# Patient Record
Sex: FEMALE | Race: WHITE | NOT HISPANIC OR LATINO
[De-identification: names, ages, dates, MRNs, and addresses within clinical notes are randomized per-mention and may not be internally consistent; named-entity substitution may affect disease eponyms.]

---

## 2021-11-23 PROBLEM — Z00.00 ENCOUNTER FOR PREVENTIVE HEALTH EXAMINATION: Status: ACTIVE | Noted: 2021-11-23

## 2021-11-29 ENCOUNTER — APPOINTMENT (OUTPATIENT)
Dept: OTOLARYNGOLOGY | Facility: CLINIC | Age: 86
End: 2021-11-29
Payer: MEDICARE

## 2021-11-29 VITALS
SYSTOLIC BLOOD PRESSURE: 123 MMHG | BODY MASS INDEX: 20.25 KG/M2 | DIASTOLIC BLOOD PRESSURE: 76 MMHG | WEIGHT: 126 LBS | HEART RATE: 56 BPM | HEIGHT: 66 IN

## 2021-11-29 DIAGNOSIS — Z80.9 FAMILY HISTORY OF MALIGNANT NEOPLASM, UNSPECIFIED: ICD-10-CM

## 2021-11-29 DIAGNOSIS — Z78.9 OTHER SPECIFIED HEALTH STATUS: ICD-10-CM

## 2021-11-29 DIAGNOSIS — H92.01 OTALGIA, RIGHT EAR: ICD-10-CM

## 2021-11-29 DIAGNOSIS — Z85.820 PERSONAL HISTORY OF MALIGNANT MELANOMA OF SKIN: ICD-10-CM

## 2021-11-29 DIAGNOSIS — Z85.3 PERSONAL HISTORY OF MALIGNANT NEOPLASM OF BREAST: ICD-10-CM

## 2021-11-29 DIAGNOSIS — H61.21 IMPACTED CERUMEN, RIGHT EAR: ICD-10-CM

## 2021-11-29 PROCEDURE — 69210 REMOVE IMPACTED EAR WAX UNI: CPT | Mod: RT

## 2021-11-29 PROCEDURE — 99213 OFFICE O/P EST LOW 20 MIN: CPT | Mod: 25

## 2021-11-29 PROCEDURE — 99203 OFFICE O/P NEW LOW 30 MIN: CPT

## 2021-11-29 RX ORDER — METOPROLOL SUCCINATE 25 MG/1
25 TABLET, EXTENDED RELEASE ORAL
Qty: 90 | Refills: 0 | Status: COMPLETED | COMMUNITY
Start: 2021-06-30

## 2021-11-29 RX ORDER — METOPROLOL SUCCINATE 200 MG/1
TABLET, EXTENDED RELEASE ORAL
Refills: 0 | Status: COMPLETED | COMMUNITY

## 2021-11-29 RX ORDER — LEVOTHYROXINE SODIUM 0.07 MG/1
75 TABLET ORAL
Qty: 90 | Refills: 0 | Status: ACTIVE | COMMUNITY
Start: 2021-09-17

## 2021-11-29 RX ORDER — ATORVASTATIN CALCIUM 40 MG/1
40 TABLET, FILM COATED ORAL
Qty: 90 | Refills: 0 | Status: ACTIVE | COMMUNITY
Start: 2021-05-18

## 2021-11-29 RX ORDER — APIXABAN 2.5 MG/1
2.5 TABLET, FILM COATED ORAL
Qty: 60 | Refills: 0 | Status: ACTIVE | COMMUNITY
Start: 2021-08-16

## 2021-11-29 RX ORDER — AMIODARONE HYDROCHLORIDE 200 MG/1
200 TABLET ORAL
Refills: 0 | Status: ACTIVE | COMMUNITY

## 2021-11-29 RX ORDER — METOPROLOL SUCCINATE 50 MG/1
50 TABLET, EXTENDED RELEASE ORAL
Qty: 90 | Refills: 0 | Status: ACTIVE | COMMUNITY
Start: 2021-09-17

## 2021-11-29 RX ORDER — LEVOTHYROXINE SODIUM 0.17 MG/1
TABLET ORAL
Refills: 0 | Status: COMPLETED | COMMUNITY

## 2021-11-29 RX ORDER — NITROFURANTOIN (MONOHYDRATE/MACROCRYSTALS) 25; 75 MG/1; MG/1
100 CAPSULE ORAL
Qty: 20 | Refills: 0 | Status: COMPLETED | COMMUNITY
Start: 2021-08-20

## 2021-11-29 RX ORDER — TAMSULOSIN HYDROCHLORIDE 0.4 MG/1
0.4 CAPSULE ORAL
Qty: 90 | Refills: 0 | Status: COMPLETED | COMMUNITY
Start: 2021-09-20

## 2021-11-29 NOTE — HISTORY OF PRESENT ILLNESS
[de-identified] : 89 y.o female presents with right ear ache for past few months. It hurts when she lies on right side.Has not affected hearing.  No tinnitus. She is suspicious of wax as that has caused ear pain in the past Last visit was on 12/10/2018 which is last time ears were cleaned. No otorrhea or hx of teeth grinding or clenching.

## 2021-11-29 NOTE — REVIEW OF SYSTEMS
[Ear Pain] : ear pain [Ear Itch] : ear itch [Easy Bruising] : a tendency for easy bruising [FreeTextEntry3] : watery eyes [FreeTextEntry1] : daytime sleepiness,palpatations,

## 2021-11-29 NOTE — PHYSICAL EXAM
[FreeTextEntry8] : cerumen impaction removed by curettage and alligator forceps [FreeTextEntry9] : cerumen removed by curettage [FreeTextEntry5] : no response to tuning fork [] : septum deviated to the left [FreeTextEntry1] : deviated septum left [Normal] : mucosa is normal [Midline] : trachea located in midline position [de-identified] : small fibroma on right lower lip

## 2021-11-29 NOTE — ASSESSMENT
[FreeTextEntry1] : reviewed and reconciled meds, allergies, Pmhx, Famhx, Sochx, Surghx\par \par Right ear pain\par Right cerumen impaction removed by curettage\par Left cerumen removed by curettage\par small fibroma on right lower lip\par \par \par Plan\par Cerumen removed\par Can try Debrox followed by bulb syringe every 3-4 weeks\par F/u in one year\par

## 2023-02-15 ENCOUNTER — APPOINTMENT (OUTPATIENT)
Dept: OTOLARYNGOLOGY | Facility: CLINIC | Age: 88
End: 2023-02-15
Payer: MEDICARE

## 2023-02-15 ENCOUNTER — NON-APPOINTMENT (OUTPATIENT)
Age: 88
End: 2023-02-15

## 2023-02-15 VITALS
HEIGHT: 66 IN | HEART RATE: 49 BPM | DIASTOLIC BLOOD PRESSURE: 72 MMHG | SYSTOLIC BLOOD PRESSURE: 111 MMHG | BODY MASS INDEX: 20.89 KG/M2 | WEIGHT: 130 LBS

## 2023-02-15 DIAGNOSIS — H65.93 UNSPECIFIED NONSUPPURATIVE OTITIS MEDIA, BILATERAL: ICD-10-CM

## 2023-02-15 DIAGNOSIS — H60.60 UNSPECIFIED CHRONIC OTITIS EXTERNA, UNSPECIFIED EAR: ICD-10-CM

## 2023-02-15 DIAGNOSIS — H91.90 UNSPECIFIED HEARING LOSS, UNSPECIFIED EAR: ICD-10-CM

## 2023-02-15 DIAGNOSIS — T45.7X1A POISONING BY ANTICOAGULANT ANTAGONISTS, VITAMIN K AND OTHER COAGULANTS, ACCIDENTAL (UNINTENTIONAL), INITIAL ENCOUNTER: ICD-10-CM

## 2023-02-15 DIAGNOSIS — D68.9 POISONING BY ANTICOAGULANT ANTAGONISTS, VITAMIN K AND OTHER COAGULANTS, ACCIDENTAL (UNINTENTIONAL), INITIAL ENCOUNTER: ICD-10-CM

## 2023-02-15 DIAGNOSIS — R04.0 EPISTAXIS: ICD-10-CM

## 2023-02-15 DIAGNOSIS — J34.2 DEVIATED NASAL SEPTUM: ICD-10-CM

## 2023-02-15 PROCEDURE — 92557 COMPREHENSIVE HEARING TEST: CPT

## 2023-02-15 PROCEDURE — 30901 CONTROL OF NOSEBLEED: CPT

## 2023-02-15 PROCEDURE — 99213 OFFICE O/P EST LOW 20 MIN: CPT | Mod: 25

## 2023-02-15 PROCEDURE — 92567 TYMPANOMETRY: CPT

## 2023-02-15 RX ORDER — FLUTICASONE PROPIONATE 50 UG/1
50 SPRAY, METERED NASAL
Qty: 1 | Refills: 5 | Status: ACTIVE | COMMUNITY
Start: 2023-02-15 | End: 1900-01-01

## 2023-02-15 RX ORDER — CLOPIDOGREL BISULFATE 75 MG/1
75 TABLET, FILM COATED ORAL
Qty: 90 | Refills: 0 | Status: DISCONTINUED | COMMUNITY
Start: 2020-06-18 | End: 2023-02-15

## 2023-02-15 RX ORDER — FUROSEMIDE 20 MG/1
20 TABLET ORAL
Qty: 30 | Refills: 0 | Status: DISCONTINUED | COMMUNITY
Start: 2021-09-09 | End: 2023-02-15

## 2023-02-15 NOTE — DATA REVIEWED
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

## 2023-02-15 NOTE — ADDENDUM
[FreeTextEntry1] : Documented by Chloe Corado acting as scribe for Dr. Ruggiero on 02/15/2023.\par \par All Medical record entries made by the scribe were at my, Dr. Ruggiero,direction and personally dictated by me on 02/15/2023. I have reviewed the chart and agree that the record accurately reflects my personal performance of the history, physical exam, assessment and plan. I have also personally directed, reviewed, and agreed with the discharge instructions.

## 2023-02-15 NOTE — HISTORY OF PRESENT ILLNESS
[de-identified] : Pt presents with daughter today to evaluate hearing c/o sudden loss of hearing. Daughter denies ear pain. Daughter noted starting in early January pt got nosebleeds 3 times a week which hasn't improved, but she could hear. Daughter notes a week and a half ago pt lost hearing completely. Daughter notes pt takes blood thinners. Daughter notes pt has h/o aphasia, but doesn't notice any neurological differences since the hearing was lost. 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 Pt notes she only experiences epistaxis on the right side.

## 2023-02-15 NOTE — ASSESSMENT
[FreeTextEntry1] : Reviewed and reconciled medications, allergies, PMHx, PSHx, SocHx, FMHx.\par \par Pt presents with daughter today to evaluate hearing c/o sudden loss of hearing. Daughter denies ear pain. Daughter noted starting in early January pt got nosebleeds 3 times a week which hasn't improved, but she could hear. Daughter notes a week and a half ago pt lost hearing completely. Daughter notes pt takes blood thinners. \par \par Physical Exam -\par cerumen removed b/l\par right side anterior superior septum bleeding site identified\par deviated septum left\par \par Audio:\par type B tymps AU\par severe to profound -8000 Hz AU\par *unmasked BC suggests mixed HL in at least one ear\par right: 64% discrim at 95db, left: 60% discrim at 95db, fluid in both ears\par \par Plan:\par Cerumen removed b/l. Right Nasal Cauterization. Audio - results interpreted by Dr. Ruggiero and reviewed with the patient. Saline gel spray 3-4x per day starting tonight. Don't blow the nose or smear, only dab and throw tissue away. Sneeze with the mouth open. Wait to hear back form Dr. Green if pt can start oral steroid - if pt can't start steroid, then start Flonase.

## 2023-02-15 NOTE — PHYSICAL EXAM
[Hearing Loss Right Only] : diminished [Hearing Loss Left Only] : diminished [] : septum deviated to the left [Midline] : trachea located in midline position [Normal] : orientation to person, place, and time: normal [FreeTextEntry1] : in wheelchair [FreeTextEntry8] : minimal dry cerumen removed via curettage  [FreeTextEntry9] : minimal dry cerumen removed via curettage  [FreeTextEntry5] : no response to tuning forks [de-identified] : right side anterior superior septum bleeding site identified

## 2023-02-15 NOTE — CONSULT LETTER
[Dear  ___] : Dear  [unfilled], [Courtesy Letter:] : I had the pleasure of seeing your patient, [unfilled], in my office today. [Please see my note below.] : Please see my note below. [Consult Closing:] : Thank you very much for allowing me to participate in the care of this patient.  If you have any questions, please do not hesitate to contact me. [Sincerely,] : Sincerely, [FreeTextEntry1] : Pt has fluid b/l, recommended to start on nasal spray and needs approval to start oral steroid - 60mg the first day, the down 5 mg each day. [FreeTextEntry3] : Paul Ruggiero MD FACS

## 2023-02-15 NOTE — PROCEDURE
[FreeTextEntry1] :  Right Nasal Cauterization [FreeTextEntry2] : right side anterior superior septum bleeding site identified [FreeTextEntry3] : Procedure: Right Nasal Cauterization. After topical 4% xylocaine and oxymetazoline, the nasal vault was re-evaluated. Bleeding site identified. Cauterized with silver nitrate. Post-procedure instructions given. Patient tolerated procedure well.

## 2023-02-17 RX ORDER — PREDNISONE 10 MG/1
10 TABLET ORAL 3 TIMES DAILY
Qty: 39 | Refills: 0 | Status: ACTIVE | COMMUNITY
Start: 2023-02-17 | End: 1900-01-01